# Patient Record
Sex: MALE | Race: WHITE | HISPANIC OR LATINO
[De-identification: names, ages, dates, MRNs, and addresses within clinical notes are randomized per-mention and may not be internally consistent; named-entity substitution may affect disease eponyms.]

---

## 2023-06-28 ENCOUNTER — NON-APPOINTMENT (OUTPATIENT)
Age: 38
End: 2023-06-28

## 2023-06-28 PROBLEM — Z00.00 ENCOUNTER FOR PREVENTIVE HEALTH EXAMINATION: Status: ACTIVE | Noted: 2023-06-28

## 2023-06-29 ENCOUNTER — NON-APPOINTMENT (OUTPATIENT)
Age: 38
End: 2023-06-29

## 2023-06-29 ENCOUNTER — APPOINTMENT (OUTPATIENT)
Dept: ORTHOPEDIC SURGERY | Facility: CLINIC | Age: 38
End: 2023-06-29
Payer: COMMERCIAL

## 2023-06-29 VITALS
BODY MASS INDEX: 27.49 KG/M2 | DIASTOLIC BLOOD PRESSURE: 70 MMHG | WEIGHT: 192 LBS | OXYGEN SATURATION: 100 % | HEART RATE: 72 BPM | SYSTOLIC BLOOD PRESSURE: 122 MMHG | HEIGHT: 70 IN

## 2023-06-29 DIAGNOSIS — M70.41 PREPATELLAR BURSITIS, RIGHT KNEE: ICD-10-CM

## 2023-06-29 PROCEDURE — 99204 OFFICE O/P NEW MOD 45 MIN: CPT

## 2023-06-29 PROCEDURE — 73564 X-RAY EXAM KNEE 4 OR MORE: CPT | Mod: RT

## 2023-06-29 RX ORDER — DICLOFENAC SODIUM 1% 10 MG/G
1 GEL TOPICAL DAILY
Qty: 1 | Refills: 2 | Status: ACTIVE | COMMUNITY
Start: 2023-06-29 | End: 1900-01-01

## 2023-06-29 NOTE — DISCUSSION/SUMMARY
[de-identified] : Right knee prepatellar tendon bursitis, recent tick bite\par \par Extensive discussion of the natural history of this issue was had with the patient.  We discussed the treatment options focusing on conservative therapy which includes anti-inflammatories, physical therapy/home exercise, & activity modification.  \par -A prescription for meloxicam with the appropriate warnings for GI, cardiac, and renal side effects was given to the patient.  Patient was instructed not to use any other NSAIDs with this medication.\par Prescription for Voltaren gel was also given.  Recommend patient use a compressive dressing on the knee.  Discussed if erythema begins or he has fevers and chills he needs to be evaluated immediately.  He will let us know about the Lyme disease blood work.\par Patient will return if the pain returns or does not resolve.  We did discuss the risk and benefits of draining the bursa at this time we will hold off.,

## 2023-06-29 NOTE — HISTORY OF PRESENT ILLNESS
[de-identified] : 6/29/2023–patient presents with right knee pain and swelling.  This began a few weeks ago.  States he was bitten by a tick in the beginning of June on his chest.  He is currently being worked up for Lyme disease.  Did have a rash around the bite at that time.  Pain is diffuse around the knee mostly on the anterior aspect.  Denies buckling symptoms.  Has had swelling.  Is taking Advil but does not help.  Has not had any prior treatments.  Denies allergies anticoagulation or past medical history.

## 2023-06-29 NOTE — PHYSICAL EXAM
[de-identified] : General Appearance: normal without acute distress\par Mental: Alert and oriented x 3\par Psych/affect: appropriate, cooperative\par Gait: Normal gait\par \par Right knee\par Inspection: Significant swelling over prepatellar bursa, no effusion in knee capsule appreciated, no erythema.\par Wounds: None.\par Alignment: neutral.\par Palpation: Tender to palpation over patella tendon bursa\par ROM active (in degrees): 0-140, pain with deep flexion\par Ligamentous laxity: stable to varus stress test, stable to valgus stress test\par Meniscal Test: Positive McMurrays, positive Gutierrez.\par Muscle Test: good quad strength. [de-identified] : 6/29/2023–right knee xrays, standing AP/Lateral and Merchant films, and 45 degree PA standing view taken today in the office are reviewed and demonstrate preserved joint space, no abnormalities

## 2023-07-14 ENCOUNTER — APPOINTMENT (OUTPATIENT)
Dept: ORTHOPEDIC SURGERY | Facility: CLINIC | Age: 38
End: 2023-07-14

## 2023-08-01 ENCOUNTER — RX RENEWAL (OUTPATIENT)
Age: 38
End: 2023-08-01

## 2023-08-01 RX ORDER — MELOXICAM 15 MG/1
15 TABLET ORAL DAILY
Qty: 30 | Refills: 1 | Status: ACTIVE | COMMUNITY
Start: 2023-06-29 | End: 1900-01-01

## 2023-08-03 ENCOUNTER — APPOINTMENT (OUTPATIENT)
Dept: ORTHOPEDIC SURGERY | Facility: CLINIC | Age: 38
End: 2023-08-03

## 2023-08-03 NOTE — PHYSICAL EXAM
CM following, pt from home with support of sons, plans OR Friday for lap luis armando, no needs anticipated at DC at this breann will follow for post-op needs.   Electronically signed by Oscar Hatfield RN on 8/5/2021 at [address] PM  [phone] [de-identified] : Right knee\par Inspection: Significant swelling over prepatellar bursa, no effusion in knee capsule appreciated, no erythema.\par Wounds: None.\par Alignment: neutral.\par Palpation: Tender to palpation over patella tendon bursa\par ROM active (in degrees): 0-140, pain with deep flexion\par Ligamentous laxity: stable to varus stress test, stable to valgus stress test\par Meniscal Test: Positive McMurrays, positive Gutierrez.\par Muscle Test: good quad strength. [de-identified] : 6/29/2023?right knee xrays, standing AP/Lateral and Merchant films, and 45 degree PA standing view taken today in the office are reviewed and demonstrate preserved joint space, no abnormalities

## 2023-08-03 NOTE — DISCUSSION/SUMMARY
[de-identified] : Right knee prepatellar tendon bursitis, recent tick bite\par \par Extensive discussion of the natural history of this issue was had with the patient.  We discussed the treatment options focusing on conservative therapy which includes anti-inflammatories, physical therapy/home exercise, & activity modification.  \par -A prescription for meloxicam with the appropriate warnings for GI, cardiac, and renal side effects was given to the patient.  Patient was instructed not to use any other NSAIDs with this medication.\par Prescription for Voltaren gel was also given.  Recommend patient use a compressive dressing on the knee.  Discussed if erythema begins or he has fevers and chills he needs to be evaluated immediately.  He will let us know about the Lyme disease blood work.\par Patient will return if the pain returns or does not resolve.  We did discuss the risk and benefits of draining the bursa at this time we will hold off.,

## 2023-08-03 NOTE — HISTORY OF PRESENT ILLNESS
[de-identified] : 8/3/23:   6/29/2023-patient presents with right knee pain and swelling.  This began a few weeks ago.  States he was bitten by a tick in the beginning of June on his chest.  He is currently being worked up for Lyme disease.  Did have a rash around the bite at that time.  Pain is diffuse around the knee mostly on the anterior aspect.  Denies buckling symptoms.  Has had swelling.  Is taking Advil but does not help.  Has not had any prior treatments.  Denies allergies anticoagulation or past medical history.

## 2025-01-29 ENCOUNTER — APPOINTMENT (OUTPATIENT)
Dept: ORTHOPEDIC SURGERY | Facility: CLINIC | Age: 40
End: 2025-01-29
Payer: COMMERCIAL

## 2025-01-29 DIAGNOSIS — M25.512 PAIN IN LEFT SHOULDER: ICD-10-CM

## 2025-01-29 DIAGNOSIS — M75.42 IMPINGEMENT SYNDROME OF LEFT SHOULDER: ICD-10-CM

## 2025-01-29 DIAGNOSIS — S43.432A SUPERIOR GLENOID LABRUM LESION OF LEFT SHOULDER, INITIAL ENCOUNTER: ICD-10-CM

## 2025-01-29 PROCEDURE — 73030 X-RAY EXAM OF SHOULDER: CPT | Mod: LT

## 2025-01-29 PROCEDURE — 73010 X-RAY EXAM OF SHOULDER BLADE: CPT | Mod: LT

## 2025-01-29 PROCEDURE — 99204 OFFICE O/P NEW MOD 45 MIN: CPT

## 2025-01-31 ENCOUNTER — APPOINTMENT (OUTPATIENT)
Dept: MRI IMAGING | Facility: CLINIC | Age: 40
End: 2025-01-31

## 2025-02-04 ENCOUNTER — APPOINTMENT (OUTPATIENT)
Dept: MRI IMAGING | Facility: CLINIC | Age: 40
End: 2025-02-04
Payer: COMMERCIAL

## 2025-02-04 PROCEDURE — 73221 MRI JOINT UPR EXTREM W/O DYE: CPT | Mod: LT

## 2025-02-05 ENCOUNTER — APPOINTMENT (OUTPATIENT)
Dept: ORTHOPEDIC SURGERY | Facility: CLINIC | Age: 40
End: 2025-02-05

## 2025-02-05 DIAGNOSIS — M19.012 PRIMARY OSTEOARTHRITIS, LEFT SHOULDER: ICD-10-CM

## 2025-02-05 DIAGNOSIS — M75.112 INCOMPLETE ROTATOR CUFF TEAR OR RUPTURE OF LEFT SHOULDER, NOT SPECIFIED AS TRAUMATIC: ICD-10-CM

## 2025-02-05 PROCEDURE — 99214 OFFICE O/P EST MOD 30 MIN: CPT | Mod: 25

## 2025-02-05 PROCEDURE — 20610 DRAIN/INJ JOINT/BURSA W/O US: CPT | Mod: LT

## 2025-02-05 RX ORDER — DICLOFENAC SODIUM 75 MG/1
75 TABLET, DELAYED RELEASE ORAL
Qty: 42 | Refills: 1 | Status: ACTIVE | COMMUNITY
Start: 2025-02-05 | End: 1900-01-01

## 2025-04-09 ENCOUNTER — APPOINTMENT (OUTPATIENT)
Dept: ORTHOPEDIC SURGERY | Facility: CLINIC | Age: 40
End: 2025-04-09
Payer: COMMERCIAL

## 2025-04-09 DIAGNOSIS — M19.012 PRIMARY OSTEOARTHRITIS, LEFT SHOULDER: ICD-10-CM

## 2025-04-09 DIAGNOSIS — M75.112 INCOMPLETE ROTATOR CUFF TEAR OR RUPTURE OF LEFT SHOULDER, NOT SPECIFIED AS TRAUMATIC: ICD-10-CM

## 2025-04-09 DIAGNOSIS — M25.512 PAIN IN LEFT SHOULDER: ICD-10-CM

## 2025-04-09 DIAGNOSIS — S43.432D SUPERIOR GLENOID LABRUM LESION OF LEFT SHOULDER, SUBSEQUENT ENCOUNTER: ICD-10-CM

## 2025-04-09 DIAGNOSIS — M75.42 IMPINGEMENT SYNDROME OF LEFT SHOULDER: ICD-10-CM

## 2025-04-09 PROCEDURE — 99213 OFFICE O/P EST LOW 20 MIN: CPT

## 2025-05-28 ENCOUNTER — APPOINTMENT (OUTPATIENT)
Dept: ORTHOPEDIC SURGERY | Facility: CLINIC | Age: 40
End: 2025-05-28